# Patient Record
Sex: FEMALE | Race: WHITE | URBAN - METROPOLITAN AREA
[De-identification: names, ages, dates, MRNs, and addresses within clinical notes are randomized per-mention and may not be internally consistent; named-entity substitution may affect disease eponyms.]

---

## 2021-07-23 ENCOUNTER — APPOINTMENT (RX ONLY)
Dept: URBAN - METROPOLITAN AREA CLINIC 312 | Facility: CLINIC | Age: 81
Setting detail: DERMATOLOGY
End: 2021-07-23

## 2021-07-23 DIAGNOSIS — D485 NEOPLASM OF UNCERTAIN BEHAVIOR OF SKIN: ICD-10-CM

## 2021-07-23 PROBLEM — D48.5 NEOPLASM OF UNCERTAIN BEHAVIOR OF SKIN: Status: ACTIVE | Noted: 2021-07-23

## 2021-07-23 PROCEDURE — 11102 TANGNTL BX SKIN SINGLE LES: CPT

## 2021-07-23 PROCEDURE — ? BIOPSY BY SHAVE METHOD

## 2021-07-23 ASSESSMENT — LOCATION SIMPLE DESCRIPTION DERM: LOCATION SIMPLE: GLABELLA

## 2021-07-23 ASSESSMENT — LOCATION ZONE DERM: LOCATION ZONE: FACE

## 2021-07-23 ASSESSMENT — LOCATION DETAILED DESCRIPTION DERM: LOCATION DETAILED: GLABELLA

## 2021-07-23 NOTE — HPI: SKIN LESIONS
How Severe Is Your Skin Lesion?: moderate
Have Your Skin Lesions Been Treated?: not been treated
Is This A New Presentation, Or A Follow-Up?: Skin Lesion
Additional History: Red flat lesion in between her eyes x3 months. No hx or family hx of skin CA. No tx. Has tried HC cr, no improvement. Pt declined FBSE today.

## 2021-07-23 NOTE — PROCEDURE: BIOPSY BY SHAVE METHOD
Detail Level: Detailed
Depth Of Biopsy: dermis
Was A Bandage Applied: Yes
Size Of Lesion In Cm: 0.8
X Size Of Lesion In Cm: 0
Biopsy Type: H and E
Biopsy Method: Dermablade
Anesthesia Type: 1% lidocaine with epinephrine
Anesthesia Volume In Cc (Will Not Render If 0): 0.1
Hemostasis: Aluminum Chloride
Wound Care: Aquaphor
Dressing: Band-Aid
Destruction After The Procedure: No
Type Of Destruction Used: Curettage
Curettage Text: The wound bed was treated with curettage after the biopsy was performed.
Cryotherapy Text: The wound bed was treated with cryotherapy after the biopsy was performed.
Electrodesiccation Text: The wound bed was treated with electrodesiccation after the biopsy was performed.
Electrodesiccation And Curettage Text: The wound bed was treated with electrodesiccation and curettage after the biopsy was performed.
Silver Nitrate Text: The wound bed was treated with silver nitrate after the biopsy was performed.
Lab: 6
Lab Facility: [id number]
Path Notes (To The Dermatopathologist): check margins
Consent: Verbal consent was obtained and risks were reviewed including but not limited to scarring, infection, bleeding, scabbing, incomplete removal, nerve damage and allergy to anesthesia.
Post-Care Instructions: I reviewed with the patient in detail post-care instructions. Patient is to keep the biopsy site dry overnight.  BID wound care instructions given and reviewed with pt.  Apply vasolene or aquaphor BID x 7-10 days.  Pt is aware bx results is a round white scar.
Notification Instructions: Patient will be notified of biopsy results. However, patient instructed to call the office if not contacted within 2 weeks.
Billing Type: Third-Party Bill
Information: Selecting Yes will display possible errors in your note based on the variables you have selected. This validation is only offered as a suggestion for you. PLEASE NOTE THAT THE VALIDATION TEXT WILL BE REMOVED WHEN YOU FINALIZE YOUR NOTE. IF YOU WANT TO FAX A PRELIMINARY NOTE YOU WILL NEED TO TOGGLE THIS TO 'NO' IF YOU DO NOT WANT IT IN YOUR FAXED NOTE.

## 2021-09-21 ENCOUNTER — APPOINTMENT (RX ONLY)
Dept: URBAN - METROPOLITAN AREA CLINIC 312 | Facility: CLINIC | Age: 81
Setting detail: DERMATOLOGY
End: 2021-09-21

## 2021-09-21 PROBLEM — C44.319 BASAL CELL CARCINOMA OF SKIN OF OTHER PARTS OF FACE: Status: ACTIVE | Noted: 2021-09-21

## 2021-09-21 PROCEDURE — ? REPAIR NOTE

## 2021-09-21 PROCEDURE — 17311 MOHS 1 STAGE H/N/HF/G: CPT

## 2021-09-21 PROCEDURE — ? ADDITIONAL NOTES

## 2021-09-21 PROCEDURE — 13131 CMPLX RPR F/C/C/M/N/AX/G/H/F: CPT

## 2021-09-21 PROCEDURE — ? MOHS SURGERY

## 2021-09-21 NOTE — PROCEDURE: REPAIR NOTE
Unique Flap 8 Text: A v-y mucosal advancement flap was outlined extending down to the inferior aspect of the sulcus. The defect and flap were anesthetized with 1% lidocaine with epinephrine using a total of 1 cc.  In addition, bilateral mental nerve blocks were administered with 2 cc of 1% lidocaine with epi at each site. After local anesthesia was obtained the flap was incised through mucosa.  The leading edge of the flap was developed by incising through posterior orbicularis and raising a 5 mm portion of muscle with the leading edge to advance anteriorly and provide additional fullness to the lip.  The labial artery was identified on both sides and preserved.  The flap was elevated in the submucosal plane inferiorly for 2.0 cm. The tissues were spread in the submucosal plane and any tethering bands released to allow the flap to advance anteriorly.  Hemostasis was obtained with electrocautery where necessary.  The inferior sulcus mucosa was closed with interrupted 5-0 chromic suture in a v to y fashion. The flap was advanced into position and key sutures were placed with 5-0 Vicryl sutures between the muscle and submucosal edge of the flap and the submucosal of the inferior aspect of the defect.  The leading mucosal edge was then sutured to the skin with a running 6-0 Prolene suture.  Interrupted 5-0 chromic sutures were then placed from anterior to superior.  To adjust for the length discrepancy along the sides, the lateral edges of the flap were incised within the moist mucosa the lengthen these sides.  The mucosal closure was then completed.  The flap was healthy at the conclusion of the procedure with no evidence of vascular compromise and a well-balanced albeit edematous lower lip.

## 2021-09-21 NOTE — PROCEDURE: REPAIR NOTE
Posterior Auricular Interpolation Flap Division And Inset Text: Division and inset of the posterior auricular interpolation flap was performed to achieve optimal aesthetic result, restore normal anatomic appearance and avoid distortion of normal anatomy, expedite and facilitate wound healing, achieve optimal functional result and because linear closure either not possible or would produce suboptimal result. The patient was prepped and draped in the usual manner. The pedicle was infiltrated with local anesthesia. The pedicle was sectioned with a #15 blade. The donor site was debrided and closed with 5-0 Prolene sutures.  The pedicle was then brought through the ear to the lateral side and the medial portion of the hole debrided and closed. The friable tissue lateral to the hole was debrided down to scaphal cartilage. The pedicle was de-bulked and trimmed to match the shape of the defect. Hemostasis was achieved. The flap was secured with interrupted 6-0 Prolene sutures.

## 2021-09-21 NOTE — PROCEDURE: REPAIR NOTE
Rotation Flap Text: Given the location of the defect, shape of the defect and the proximity to free margins a rotation flap was deemed most appropriate. Using a sterile surgical marker an appropriate rotation flap was drawn incorporating the defect and designing a rotation flap based inferiorly with the incision in the preauricular crease, with the Burow's triangle marked at the superior helical rim. The area thus outlined was incised through dermis with a #15 scalpel blade.  The skin margins were undermined in the subcutaneous plane utilizing iris scissors. The dissection proceeded until the flap could be rotated and advanced into the defect without undue tension.

## 2021-09-21 NOTE — PROCEDURE: REPAIR NOTE
Bilateral Helical Rim Advancement Flap Text: The defect edges were debeveled with a #15 blade scalpel.  Given the location of the defect and the proximity to free margins (helical rim) a bilateral helical rim advancement flap was deemed most appropriate.  Using a sterile surgical marker, the appropriate advancement flaps were drawn incorporating the defect and placing the expected incisions between the helical rim and scapha where possible.  The edges of the helical rim defect were excised to create 90 degree angles. The skin at the base of the helical rim was incised and the flaps elevated based on the post-auricular skin.  The flaps were advanced to each other and the leading edge closed with horizontal mattress and simple interrupted sutures.  The inside of the helical rim at the scapha was closed with a running suture, as was the skin on the posterior side of the pinna.

## 2021-09-21 NOTE — PROCEDURE: REPAIR NOTE
Advancement Flap (Double) Text: Given the location of the defect and the proximity to free margins a double advancement flap was deemed most appropriate.  Using a sterile surgical marker, the appropriate advancement flaps were drawn incorporating the defect and placing the expected incisions within the relaxed skin tension lines of the forehead.  The area thus outlined was incised deep to adipose tissue with a #15 scalpel blade and the flaps elevated just above the frontalis muscle.  The skin margins were undermined to an appropriate distance in all directions in this same plane.

## 2021-09-21 NOTE — PROCEDURE: REPAIR NOTE
Paramedian Forehead Flap Text: A decision was made to reconstruct the defect utilizing a paramedian forehead flap and a staged reconstruction.  The nasal tip defect was made symmetric to include the entire nasal tip subunit. A template of the defect was made with a suture pack lining and transferred to the left upper forehead just below the hairline.  The left medial canthus was noted and the pedicle of the flap centered just lateral to this point at the medial eyebrow with a width of 12 mm, centered about a point 12 mm from the midline. The flap was anesthetized with 5 cc of 1% lido with epinephrine. The flap was then incised and elevated from superior to inferior, starting in the deep subcutaneous plane and deepening the dissection inferiorly to the periosteum.  The dissection proceeded inferiorly to the orbital rim at which point the flap could be transposed into the defect without tension.  Hemostasis was assured and the donor site was closed with interrupted 4-0 Vicryl for deep dermis and a running 6-0 Prolene suture for skin with care being taken not to compress the pedicle. The flap was transposed into the defect and inset with interrupted 5-0 Monocryl suture for deep dermis, especially along the leading edge followed by interrupted 6-0 Prolene sutures for skin. . \\nSurgicel and Gelfoam was applied to the raw surface of the pedicle, followed by a mild compression dressing to the nose and forehead.\\n At the conclusion of the procedure the flap was healthy with no evidence of vascular compromise.  The suture line was coated with mupirocin ointment and a sterile dressing applied.

## 2021-09-21 NOTE — PROCEDURE: REPAIR NOTE
Unique Flap 1 Text: Given the location of the secondary flap donor site defect which included the defect following excision of the more posterior BCC which measured 1.5 x 1.5 cm, and in order to avoid a skin graft, a scalp rotation flap was deemed most appropriate.  Using a sterile surgical marker, an appropriate rotation flap was drawn incorporating the defect, based posteriorly.  The area thus outlined was incised deep to galea with a #15 scalpel blade.  The skin margins were undermined in the subgaleal plane to an appropriate distance in all directions using a scalpel.

## 2021-09-21 NOTE — PROCEDURE: REPAIR NOTE
O-T Plasty Text: Given the location of the defect, shape of the defect and the proximity to free margins an O-T plasty was deemed most appropriate to keep the scars at the anterior hairline and in the frontal scalp.  Using a sterile surgical marker, an appropriate O-T plasty was drawn incorporating the defect and placing the expected incisions within the anterior hairline, with the SCD posteriorly marked for excision.    The area thus outlined was incised deep to adipose tissue with a #15 scalpel blade.  The skin margins were undermined to an appropriate distance in all directions in the subgaleal plane.

## 2021-09-21 NOTE — PROCEDURE: REPAIR NOTE
Rhomboid Transposition Flap Text: Examination reveals an extensive defect just lateral to a previous cheek closure performed by me about 4 months ago, thus the right cheek is tight and the defect not amenable to primary closure. I elected to reconstruct the defect with a rhomboid flap based inferiorly to reduce the size of the defect in all directions as well as recruit tissue into the region to allow closure.\\n A flap based medially was designed, leaving the donor site closure as a horizontal line just below the inferior mandibular border.  The area thus outlined was incised deep to adipose tissue with a #15 scalpel blade and elevated in the deep dermis above the platysma muscle with curved iris scissors.  The skin margins were undermined to an appropriate distance in all directions utilizing iris scissors.

## 2021-09-21 NOTE — PROCEDURE: REPAIR NOTE
Advancement Flap (Single) Text: Using a sterile surgical marker, an appropriate advancement flap was drawn incorporating the defect and placing the incisions in prominent cervical lines.  The area thus outlined was incised in the adipose layer with a #15 scalpel blade and elevated in the plane just above the platysma muscle. The skin margins were undermined to an appropriate distance in all directions utilizing iris scissors.

## 2021-09-21 NOTE — PROCEDURE: MOHS SURGERY
Body Location Override (Optional - Billing Will Still Be Based On Selected Body Map Location If Applicable): Glabellar forehead

## 2021-09-28 ENCOUNTER — APPOINTMENT (RX ONLY)
Dept: URBAN - METROPOLITAN AREA CLINIC 312 | Facility: CLINIC | Age: 81
Setting detail: DERMATOLOGY
End: 2021-09-28

## 2021-09-28 DIAGNOSIS — Z48.02 ENCOUNTER FOR REMOVAL OF SUTURES: ICD-10-CM

## 2021-09-28 PROCEDURE — ? SUTURE REMOVAL (GLOBAL PERIOD)

## 2021-09-28 PROCEDURE — 99024 POSTOP FOLLOW-UP VISIT: CPT

## 2021-09-28 ASSESSMENT — LOCATION ZONE DERM: LOCATION ZONE: FACE

## 2021-09-28 ASSESSMENT — LOCATION SIMPLE DESCRIPTION DERM: LOCATION SIMPLE: GLABELLA

## 2021-09-28 ASSESSMENT — LOCATION DETAILED DESCRIPTION DERM: LOCATION DETAILED: GLABELLA

## 2021-09-28 NOTE — PROCEDURE: SUTURE REMOVAL (GLOBAL PERIOD)
Detail Level: Simple
Add 28729 Cpt? (Important Note: In 2017 The Use Of 74988 Is Being Tracked By Cms To Determine Future Global Period Reimbursement For Global Periods): yes

## 2021-11-01 ENCOUNTER — APPOINTMENT (RX ONLY)
Dept: URBAN - METROPOLITAN AREA CLINIC 312 | Facility: CLINIC | Age: 81
Setting detail: DERMATOLOGY
End: 2021-11-01

## 2021-11-01 DIAGNOSIS — Z02.9 ENCOUNTER FOR ADMINISTRATIVE EXAMINATIONS, UNSPECIFIED: ICD-10-CM

## 2021-11-09 ENCOUNTER — APPOINTMENT (RX ONLY)
Dept: URBAN - METROPOLITAN AREA CLINIC 312 | Facility: CLINIC | Age: 81
Setting detail: DERMATOLOGY
End: 2021-11-09

## 2021-11-09 DIAGNOSIS — L90.5 SCAR CONDITIONS AND FIBROSIS OF SKIN: ICD-10-CM

## 2021-11-09 DIAGNOSIS — Z85.828 PERSONAL HISTORY OF OTHER MALIGNANT NEOPLASM OF SKIN: ICD-10-CM

## 2021-11-09 PROCEDURE — ? COUNSELING

## 2021-11-09 PROCEDURE — ? ADDITIONAL NOTES

## 2021-11-09 PROCEDURE — 99212 OFFICE O/P EST SF 10 MIN: CPT

## 2021-11-09 ASSESSMENT — LOCATION ZONE DERM: LOCATION ZONE: FACE

## 2021-11-09 ASSESSMENT — LOCATION DETAILED DESCRIPTION DERM: LOCATION DETAILED: GLABELLA

## 2021-11-09 ASSESSMENT — LOCATION SIMPLE DESCRIPTION DERM: LOCATION SIMPLE: GLABELLA

## 2021-12-30 NOTE — PROCEDURE: MOHS SURGERY
Routed to Dr. Elliott   Transposition Flap Text: The defect edges were debeveled with a #15 scalpel blade.  Given the location of the defect and the proximity to free margins a transposition flap was deemed most appropriate.  Using a sterile surgical marker, an appropriate transposition flap was drawn incorporating the defect.    The area thus outlined was incised deep to adipose tissue with a #15 scalpel blade.  The skin margins were undermined to an appropriate distance in all directions utilizing iris scissors.

## 2022-11-02 ENCOUNTER — APPOINTMENT (RX ONLY)
Dept: URBAN - METROPOLITAN AREA CLINIC 312 | Facility: CLINIC | Age: 82
Setting detail: DERMATOLOGY
End: 2022-11-02

## 2022-11-02 DIAGNOSIS — D485 NEOPLASM OF UNCERTAIN BEHAVIOR OF SKIN: ICD-10-CM

## 2022-11-02 DIAGNOSIS — Z85.828 PERSONAL HISTORY OF OTHER MALIGNANT NEOPLASM OF SKIN: ICD-10-CM

## 2022-11-02 PROBLEM — D48.5 NEOPLASM OF UNCERTAIN BEHAVIOR OF SKIN: Status: ACTIVE | Noted: 2022-11-02

## 2022-11-02 PROCEDURE — 99212 OFFICE O/P EST SF 10 MIN: CPT | Mod: 25

## 2022-11-02 PROCEDURE — ? COUNSELING

## 2022-11-02 PROCEDURE — 11102 TANGNTL BX SKIN SINGLE LES: CPT

## 2022-11-02 PROCEDURE — ? BIOPSY BY SHAVE METHOD

## 2022-11-02 ASSESSMENT — LOCATION SIMPLE DESCRIPTION DERM: LOCATION SIMPLE: NOSE

## 2022-11-02 ASSESSMENT — LOCATION ZONE DERM: LOCATION ZONE: NOSE

## 2022-11-02 ASSESSMENT — LOCATION DETAILED DESCRIPTION DERM: LOCATION DETAILED: NASAL SUPRATIP

## 2022-11-02 NOTE — HPI: EVALUATION OF SKIN LESION(S)
What Type Of Note Output Would You Prefer (Optional)?: Standard Output
Hpi Title: Evaluation of a Skin Lesion
How Severe Are Your Spot(S)?: moderate
Additional History: Pt is here for  inspection of nose.  \\n\\n1) pt has spot on nose. Has been present since june. It is not healing.  Her pcp gave her metheltrexate.  It helped initially, but spot has returned.